# Patient Record
Sex: FEMALE | Race: WHITE | NOT HISPANIC OR LATINO | Employment: FULL TIME | URBAN - NONMETROPOLITAN AREA
[De-identification: names, ages, dates, MRNs, and addresses within clinical notes are randomized per-mention and may not be internally consistent; named-entity substitution may affect disease eponyms.]

---

## 2020-04-10 ENCOUNTER — OFFICE VISIT CONVERTED (OUTPATIENT)
Dept: FAMILY MEDICINE CLINIC | Age: 40
End: 2020-04-10
Attending: NURSE PRACTITIONER

## 2020-11-23 ENCOUNTER — OFFICE VISIT CONVERTED (OUTPATIENT)
Dept: FAMILY MEDICINE CLINIC | Age: 40
End: 2020-11-23
Attending: NURSE PRACTITIONER

## 2021-04-02 ENCOUNTER — HOSPITAL ENCOUNTER (OUTPATIENT)
Dept: OTHER | Facility: HOSPITAL | Age: 41
Discharge: HOME | End: 2021-04-02
Attending: NURSE PRACTITIONER

## 2021-04-02 LAB
APPEARANCE UR: ABNORMAL
BACTERIA UR QL AUTO: ABNORMAL
BILIRUB UR QL: NEGATIVE
CASTS URNS QL MICRO: ABNORMAL /[LPF]
COLOR UR: ABNORMAL
CONV LEUKOCYTE ESTERASE: ABNORMAL
CONV UROBILINOGEN IN URINE BY AUTOMATED TEST STRIP: 0.2 {EHRLICHU}/DL (ref 0.1–1)
EPI CELLS #/AREA URNS HPF: ABNORMAL /[HPF]
GLUCOSE 24H UR-MCNC: NEGATIVE MG/DL
HGB UR QL STRIP: ABNORMAL
KETONES UR QL STRIP: NEGATIVE MG/DL
MUCOUS THREADS URNS QL MICRO: ABNORMAL
NITRITE UR-MCNC: POSITIVE MG/ML
PH UR STRIP.AUTO: 6 [PH] (ref 5–8)
PROT UR-MCNC: 100 MG/DL
RBC # BLD AUTO: ABNORMAL /[HPF]
SP GR UR STRIP: <=1.005 (ref 1–1.03)
SPECIMEN SOURCE: ABNORMAL
UNIDENT CRYS URNS QL MICRO: ABNORMAL /[HPF]
WBC #/AREA URNS HPF: ABNORMAL /[HPF]

## 2021-04-04 LAB — BACTERIA UR CULT: NORMAL

## 2021-04-28 ENCOUNTER — HOSPITAL ENCOUNTER (OUTPATIENT)
Dept: OTHER | Facility: HOSPITAL | Age: 41
Discharge: HOME | End: 2021-04-28
Attending: NURSE PRACTITIONER

## 2021-04-28 LAB
ALBUMIN SERPL-MCNC: 4.6 G/DL (ref 3.5–5)
ALBUMIN/GLOB SERPL: 1.8 {RATIO} (ref 1.4–2.6)
ALP SERPL-CCNC: 50 U/L (ref 42–98)
ALT SERPL-CCNC: 20 U/L (ref 10–40)
ANION GAP SERPL CALC-SCNC: 16 MMOL/L (ref 8–19)
APPEARANCE UR: ABNORMAL
AST SERPL-CCNC: 15 U/L (ref 15–50)
BASOPHILS # BLD MANUAL: 0.03 10*3/UL (ref 0–0.2)
BASOPHILS NFR BLD MANUAL: 0.4 % (ref 0–3)
BILIRUB SERPL-MCNC: 0.22 MG/DL (ref 0.2–1.3)
BILIRUB UR QL: NEGATIVE
BUN SERPL-MCNC: 8 MG/DL (ref 5–25)
BUN/CREAT SERPL: 9 {RATIO} (ref 6–20)
CALCIUM SERPL-MCNC: 9.9 MG/DL (ref 8.7–10.4)
CHLORIDE SERPL-SCNC: 104 MMOL/L (ref 99–111)
CHOLEST SERPL-MCNC: 191 MG/DL (ref 107–200)
CHOLEST/HDLC SERPL: 2.9 {RATIO} (ref 3–6)
COLOR UR: YELLOW
CONV BACTERIA: ABNORMAL
CONV CO2: 25 MMOL/L (ref 22–32)
CONV COLLECTION SOURCE (UA): ABNORMAL
CONV HYALINE CASTS IN URINE MICRO: ABNORMAL /[LPF]
CONV TOTAL PROTEIN: 7.1 G/DL (ref 6.3–8.2)
CONV UROBILINOGEN IN URINE BY AUTOMATED TEST STRIP: 1 {EHRLICHU}/DL (ref 0.1–1)
CONV YEAST, UA: PRESENT
CREAT UR-MCNC: 0.9 MG/DL (ref 0.5–0.9)
DEPRECATED RDW RBC AUTO: 42 FL
EOSINOPHIL # BLD MANUAL: 0.14 10*3/UL (ref 0–0.7)
EOSINOPHIL NFR BLD MANUAL: 2.1 % (ref 0–7)
ERYTHROCYTE [DISTWIDTH] IN BLOOD BY AUTOMATED COUNT: 12.4 % (ref 11.5–14.5)
GFR SERPLBLD BASED ON 1.73 SQ M-ARVRAT: >60 ML/MIN/{1.73_M2}
GLOBULIN UR ELPH-MCNC: 2.5 G/DL (ref 2–3.5)
GLUCOSE SERPL-MCNC: 94 MG/DL (ref 65–99)
GLUCOSE UR QL: NEGATIVE MG/DL
GRANS (ABSOLUTE): 3.95 10*3/UL (ref 2–8)
GRANS: 59.3 % (ref 30–85)
HBA1C MFR BLD: 13.5 G/DL (ref 12–16)
HCT VFR BLD AUTO: 40.7 % (ref 37–47)
HDLC SERPL-MCNC: 65 MG/DL (ref 40–60)
HGB UR QL STRIP: NEGATIVE
IMM GRANULOCYTES # BLD: 0.01 10*3/UL (ref 0–0.54)
IMM GRANULOCYTES NFR BLD: 0.1 % (ref 0–0.43)
KETONES UR QL STRIP: NEGATIVE MG/DL
LDLC SERPL CALC-MCNC: 108 MG/DL (ref 70–100)
LEUKOCYTE ESTERASE UR QL STRIP: ABNORMAL
LYMPHOCYTES # BLD MANUAL: 2.04 10*3/UL (ref 1–5)
LYMPHOCYTES NFR BLD MANUAL: 7.6 % (ref 3–10)
MCH RBC QN AUTO: 30.3 PG (ref 27–31)
MCHC RBC AUTO-ENTMCNC: 33.2 G/DL (ref 33–37)
MCV RBC AUTO: 91.5 FL (ref 81–99)
MONOCYTES # BLD AUTO: 0.51 10*3/UL (ref 0.2–1.2)
NITRITE UR QL STRIP: NEGATIVE
OSMOLALITY SERPL CALC.SUM OF ELEC: 290 MOSM/KG (ref 273–304)
PH UR STRIP.AUTO: 7.5 [PH] (ref 5–8)
PLATELET # BLD AUTO: 246 10*3/UL (ref 130–400)
PMV BLD AUTO: 9.1 FL (ref 7.4–10.4)
POTASSIUM SERPL-SCNC: 4.4 MMOL/L (ref 3.5–5.3)
PROT UR QL: NEGATIVE MG/DL
RBC # BLD AUTO: 4.45 10*6/UL (ref 4.2–5.4)
RBC #/AREA URNS HPF: ABNORMAL /[HPF]
SODIUM SERPL-SCNC: 141 MMOL/L (ref 135–147)
SP GR UR: 1.02 (ref 1–1.03)
SQUAMOUS SPT QL MICRO: ABNORMAL /[HPF]
T4 FREE SERPL-MCNC: 0.8 NG/DL (ref 0.9–1.8)
TRIGL SERPL-MCNC: 91 MG/DL (ref 40–150)
TSH SERPL-ACNC: 6.91 M[IU]/L (ref 0.27–4.2)
VARIANT LYMPHS NFR BLD MANUAL: 30.5 % (ref 20–45)
VLDLC SERPL-MCNC: 18 MG/DL (ref 5–37)
WBC # BLD AUTO: 6.68 10*3/UL (ref 4.8–10.8)
WBC #/AREA URNS HPF: ABNORMAL /[HPF]

## 2021-04-29 LAB
CONV ANTI MICROSOMAL AB: 87 IU/ML (ref 0–34)
THYROGLOBULIN ANTIBODY: <1 IU/ML (ref 0–0.9)

## 2021-04-30 LAB — BACTERIA UR CULT: NORMAL

## 2021-05-04 ENCOUNTER — OFFICE VISIT CONVERTED (OUTPATIENT)
Dept: FAMILY MEDICINE CLINIC | Age: 41
End: 2021-05-04
Attending: NURSE PRACTITIONER

## 2021-05-18 NOTE — PROGRESS NOTES
Ambika Joe  1980     Office/Outpatient Visit    Visit Date:  08:45 am    Provider: Alex Coates N.P. (Assistant: Sarah Santana MA)    Location: [facility]        Electronically signed by Alex Coates N.P. on  2020 09:27:01 AM                             Subjective:        CC: Ms. Joe is a 40 year old female.  Follow up [facility] [phone];         HPI:       Telehealth visit for weight gain. Has tried to take Phentermin in 2020 but never really got on board and did not take it like it is directed. Now is ready to restart , going to [employer] wat[employer] now  , has their points tracker and is watching her intake and drinking more water.    ROS:     CONSTITUTIONAL:  Positive for unintentional weight gain ( gradual ).   Negative for fatigue or fever.      CARDIOVASCULAR:  Negative for chest pain, palpitations, tachycardia, orthopnea, and edema.      RESPIRATORY:  Negative for recent cough, dyspnea and frequent wheezing.      GASTROINTESTINAL:  Negative for abdominal pain, constipation, diarrhea, nausea and vomiting.      PSYCHIATRIC:  Positive for taking wellbutrin 300mg daily  , thinks it may be making her foggy brained, wants to try lower dose to see if that helps..          Past Medical History / Family History / Social History:         Last Reviewed on 2020 09:23 AM by Alex Coates    Past Medical History:                 PAST MEDICAL HISTORY             GYNECOLOGICAL HISTORY:        No pregnancy-related problems.    No problems with menstrual cycles.    Contraception: S/P tubal ligation;    Menarche occurred at age 13.    (+) hx of abnormal Pap ( dx'd in  , LEEP , since then neg )         PREVENTIVE HEALTH MAINTENANCE             PAP SMEAR: was last done  with normal results         Surgical History:     Other Surgeries     section: X 2; 2012, 2014;     Left hand surgery at age 21;         Family History:      Father: Medical history unknown     Mother: Healthy     Brother(s): one brother alive and healthy brother(s) total     Sister(s): 2 sisters alive and healthy sister(s) total         Social History:     Occupation: PT [employer]     Marital Status:      Children: 4 children         Tobacco/Alcohol/Supplements:     Last Reviewed on 11/23/2020 09:23 AM by Alex Coates    Tobacco: She has never smoked.          Substance Abuse History:     Last Reviewed on 11/23/2020 09:23 AM by Alex Coates        Mental Health History:     Last Reviewed on 11/23/2020 09:23 AM by Alex Coates        Communicable Diseases (eg STDs):     Last Reviewed on 11/23/2020 09:23 AM by Alex Coates        Current Problems:     Last Reviewed on 11/23/2020 09:23 AM by Aelx Coates    Morbid (severe) obesity due to excess calories    Adjustment disorder with mixed anxiety and depressed mood    Essential (primary) hypertension        Immunizations:     None        Allergies:     Last Reviewed on 11/23/2020 09:23 AM by Alex Coates    Hydrocodone/Acetaminophen:          Current Medications:     Last Reviewed on 11/23/2020 09:23 AM by Alex Coates    buPROPion  mg oral Tablet, Extended Release 24 hr [take 1 tablet (300 mg) by oral route once daily]    phentermine 37.5 mg oral tablet [TAKE ONE TABLET BY MOUTH DAILY]    metoprolol succinate 50 mg oral Tablet, Extended Release 24 hr [take 1 tablet (50 mg) by oral route once daily]    Zofran 4 mg oral tablet [1 tablet every 6 hrs prn nausea]        Objective:        Vitals:         Current: 11/23/2020 9:10:01 AM    Ht:  5 ft, 7 in;  Wt: 240 lbs;  BMI: 37.6BP: 118/72 mm Hg (left arm, sitting)        Exams:     PHYSICAL EXAM:     GENERAL: no apparent distress;     NEUROLOGIC: GROSSLY INTACT     PSYCHIATRIC:  appropriate affect and demeanor; normal speech pattern; grossly normal memory;         Assessment:         E66.01   Morbid (severe)  obesity due to excess calories       F43.23   Adjustment disorder with mixed anxiety and depressed mood           ORDERS:         Meds Prescribed:       [New Rx] buPROPion  mg oral Tablet, Extended Release 24 hr [TAKE 1 TABLET BY MOUTH EVERY DAY], #30 (thirty) tablets, Refills: 0 (zero)       [Refilled] phentermine 37.5 mg oral tablet [TAKE ONE TABLET BY MOUTH DAILY], #30 (thirty) tablets, Refills: 0 (zero)         Lab Orders:       APPTO  Appointment need  (In-House)                      Plan:         Morbid (severe) obesity due to excess calories    Telehealth: Verbal consent obtained for visit to occur via phone call; Staff, other than provider, present during telephone visit include Ambika Joe pt, Alex GONZALEZ; Total time spent was 12 minutes; 80213--Vrmzlszhi E/M 11-20 minutes CARE PLAN:  This plan was designed by your provider to assist in improving your health and well being.      GOALS for better health: achieve healthy weight     EXERCISE Your provider has outlined the following exercise regiment:    CARDIO at low intensity (examples of low intensity exercise include: walking, swimming, and stretching) for 0-15 minutes three times a week     NUTRITION Your provider advises limiting foods that taste sweet including: soda, candy, cake, pie, jam, canned fruit in syrup and cookies, limiting foods high in carbs like baked goods, low fat packaged foods, potatoes, pizza, and sugary cereals, and staying hydrated: the [place] of [employer] determined men need roughly 3 liters (about 13 cups) of total beverages a day. Women need about 2.2 liters (about 9 cups) of total beverages a day.            Prescriptions:       [Refilled] phentermine 37.5 mg oral tablet [TAKE ONE TABLET BY MOUTH DAILY], #30 (thirty) tablets, Refills: 0 (zero)         Adjustment disorder with mixed anxiety and depressed mood        FOLLOW-UP: Schedule follow-up appointments on a p.r.n. basis.:  Schedule a follow-up  visit in 1 month.:.:for Will need UDS           Prescriptions:       [New Rx] buPROPion  mg oral Tablet, Extended Release 24 hr [TAKE 1 TABLET BY MOUTH EVERY DAY], #30 (thirty) tablets, Refills: 0 (zero)           Orders:       APPTO  Appointment need  (In-House)                  Patient Recommendations:        For  Adjustment disorder with mixed anxiety and depressed mood:    Schedule follow-up appointments as needed. Schedule a follow-up visit in 1 month.                APPOINTMENT INFORMATION:        Monday Tuesday Wednesday Thursday Friday Saturday Sunday            Time:___________________AM  PM   Date:_____________________             Charge Capture:         Primary Diagnosis:     E66.01  Morbid (severe) obesity due to excess calories           Orders:      73353  Phys/QHP telephone evaluation 11-20 minutes  (In-House)              F43.23  Adjustment disorder with mixed anxiety and depressed mood           Orders:      APPTO  Appointment need  (In-House)

## 2021-05-18 NOTE — PROGRESS NOTES
Ambika Joe  1980     Office/Outpatient Visit    Visit Date: Fri, Apr 10, 2020 09:45 am    Provider: Alex Coates N.P. (Assistant: Mary Chun MA)    Location: [facility]        Electronically signed by Alex Coates N.P. on  04/10/2020 10:45:17 AM                             Subjective:        CC: Ms. Joe is a 39 year old female.  establish care, weight loss;         HPI:       Teleheath visit to establish care and discuss weight gain. Has tried weight watchers, NOOM, Keto diet, Nutra system, and OTC weight loss meds that are suppose to help with weight loss. Has taken Phentermine in the past (about 6 years ago) and lost about 45 pounds in 3mo period.    ROS:     CONSTITUTIONAL:  Positive for unintentional weight gain ( rapid ).   Negative for fatigue or fever.      CARDIOVASCULAR:  Negative for chest pain, palpitations, tachycardia, orthopnea, and edema.      RESPIRATORY:  Negative for recent cough, dyspnea and frequent wheezing.      GASTROINTESTINAL:  Negative for abdominal pain, constipation, diarrhea, nausea and vomiting.      GENITOURINARY:  Negative for dysuria and hematuria.      PSYCHIATRIC:  Negative for anxiety, depression, and sleep disturbances.          Past Medical History / Family History / Social History:         Last Reviewed on 4/10/2020 10:17 AM by Alex Coates    Past Medical History:                 PAST MEDICAL HISTORY             GYNECOLOGICAL HISTORY:        No pregnancy-related problems.    No problems with menstrual cycles.    Contraception: S/P tubal ligation;    Menarche occurred at age 13.    (+) hx of abnormal Pap ( dx'd in  , LEEP , since then neg )         PREVENTIVE HEALTH MAINTENANCE             PAP SMEAR: was last done  with normal results         Surgical History:     Other Surgeries     section: X 2; 2012, 2014;     Left hand surgery at age 21;         Family History:     Father: Medical history  unknown     Mother: Healthy     Brother(s): one brother alive and healthy brother(s) total     Sister(s): 2 sisters alive and healthy sister(s) total         Social History:     Occupation: PT [employer]     Marital Status:      Children: 4 children         Tobacco/Alcohol/Supplements:     Last Reviewed on 4/10/2020 10:17 AM by Alex Coates    Tobacco: She has never smoked.          Mental Health History:     Last Reviewed on 4/10/2020 10:17 AM by Alex Coates        Current Problems:     Last Reviewed on 4/10/2020 10:17 AM by Alex Coates    Adjustment disorder with mixed anxiety and depressed mood    Essential (primary) hypertension        Immunizations:     None        Allergies:     Last Reviewed on 4/10/2020 10:17 AM by Alex Coates    Hydrocodone/Acetaminophen:          Current Medications:     Last Reviewed on 4/10/2020 10:17 AM by Alex Coates    buPROPion  mg oral Tablet, Extended Release 24 hr [take 1 tablet (300 mg) by oral route once daily]    metoprolol succinate 50 mg oral Tablet, Extended Release 24 hr [take 1 tablet (50 mg) by oral route once daily]        Objective:        Vitals: 132/72 this am         Assessment:         E66.01   Morbid (severe) obesity due to excess calories           ORDERS:         Meds Prescribed:       [New Rx] phentermine 37.5 mg oral tablet [1 tab daily], #30 (thirty) capsules, Refills: 0 (zero)       [Refilled] metoprolol succinate 50 mg oral Tablet, Extended Release 24 hr [take 1 tablet (50 mg) by oral route once daily], #90 (ninety) tablets, Refills: 0 (zero)         Lab Orders:       APPTO  Appointment need  (In-House)                      Plan:         Morbid (severe) obesity due to excess caloriesCome in when your able for UDS, and sign consent. Pablo today. dmt    CARE PLAN:  This plan was designed by your provider to assist in improving your health and well being.      WEIGHT recommendation to improve your health:  lose Lose 10% weight pounds     EXERCISE Your provider has outlined the following exercise regiment:    CARDIO at medium intensity (exercise that raises your heart rate to a point where you sweat and feel you're working, yet you're able to carry on a conversation) for 15-30 minutes three times a week     NUTRITION Your provider advises limiting foods high in fat like processed meats (sausage), nuts, cheeses, butter, and packaged foods with hydrogenated oils, limiting foods high in carbs like baked goods, low fat packaged foods, potatoes, pizza, and sugary cereals, and staying hydrated: the [place] of Medicine determined men need roughly 3 liters (about 13 cups) of total beverages a day. Women need about 2.2 liters (about 9 cups) of total beverages a day.      BLOOD PRESSURE Keep under 140, and under 90 Telehealth: Verbal consent obtained for visit to occur via televideo conferencing; Staff, other than provider, present during telephone visit include LISA Jacques, Ambika Joe pt; Total time spent was 25 minutes     FOLLOW-UP: Schedule a follow-up visit in 1 month.:.            Prescriptions:       [New Rx] phentermine 37.5 mg oral tablet [1 tab daily], #30 (thirty) capsules, Refills: 0 (zero)           Orders:       APPTO  Appointment need  (In-House)                  Other Prescriptions:       [Refilled] metoprolol succinate 50 mg oral Tablet, Extended Release 24 hr [take 1 tablet (50 mg) by oral route once daily], #90 (ninety) tablets, Refills: 0 (zero)         Patient Recommendations:        For  Morbid (severe) obesity due to excess calories:    Schedule a follow-up visit in 1 month.                APPOINTMENT INFORMATION:        Monday Tuesday Wednesday Thursday Friday Saturday Sunday            Time:___________________AM  PM   Date:_____________________             Charge Capture:         Primary Diagnosis:     E66.01  Morbid (severe) obesity due to excess calories           Orders:       71783  Office visit - new pt, level 2  (In-House)            APPTO  Appointment need  (In-House)

## 2021-05-18 NOTE — PROGRESS NOTES
"Ambika Joe  1980     Office/Outpatient Visit    Visit Date: Tue, May 4, 2021 11:15 am    Provider: Alex Coates N.P. (Assistant: Treasure Prajapati LPN)    Location: [facility]        Electronically signed by Alex Coates N.P. on  05/04/2021 11:53:20 AM                             Subjective:        CC: Ms. Joe is a 40 year old female.  preventative exam, [facility] [phone];         HPI:           Ms. Joe presents with encounter for general adult medical examination without abnormal findings.  She cannot recall when she last had a physical exam.  Her last menstrual period was first part of april, has had a BLT.  Her current method of contraception is a tubal ligation.  She performs breast self-exams occasionally.   Her last Pap smear was <1 year ago, was normal, and NP Kasandra Money in [facility], in practice with \"Dr. Shahid\".   She has never had a mammogram. Preventative Health updated today.  Ms. Joe has had an abnormal Pap smear in the past. 13 years ago Tobacco: She has never smoked.            PHQ-9 Depression Screening: Completed form scanned and in chart; Total Score 9     ROS:     CONSTITUTIONAL:  Positive for unintentional weight gain ( gradual ).   Negative for fatigue or fever.      E/N/T:  Negative for diminished hearing and nasal congestion.      CARDIOVASCULAR:  Negative for chest pain, palpitations, tachycardia, orthopnea, and edema.      RESPIRATORY:  Negative for recent cough, dyspnea and frequent wheezing.      GASTROINTESTINAL:  Negative for abdominal pain, constipation, diarrhea, nausea and vomiting.      GENITOURINARY:  Negative for dysuria and hematuria.      ENDOCRINE:  Positive for new dx of Hypothyroidism, just started on thyroid med.      PSYCHIATRIC:  Positive for anxiety and feelings of stress.   Negative for crying spells, anhedonia, mood swings, sadness or suicidal thoughts.          Past Medical History / Family History / Social " History:         Last Reviewed on 2021 11:41 AM by Alex Coates    Past Medical History:                 PAST MEDICAL HISTORY             GYNECOLOGICAL HISTORY:        No pregnancy-related problems.    No problems with menstrual cycles.    Contraception: S/P tubal ligation;    Menarche occurred at age 13.    (+) hx of abnormal Pap ( dx'd in  , LEEP , since then neg )         PREVENTIVE HEALTH MAINTENANCE             PAP SMEAR: was last done 10/2020 with normal results         Surgical History:     Other Surgeries     section: X 2; , 2014;     Left hand surgery at age 21;         Family History:     Father: Medical history unknown     Mother: Healthy     Brother(s): one brother alive and healthy brother(s) total     Sister(s): 2 sisters alive and healthy sister(s) total         Social History:     Occupation: PT [employer]     Marital Status:      Children: 4 children         Tobacco/Alcohol/Supplements:     Last Reviewed on 2021 11:41 AM by Alex Coates    Tobacco: She has never smoked.          Mental Health History:     Last Reviewed on 2021 11:41 AM by Alex Coates        Current Problems:     Last Reviewed on 2021 11:41 AM by Alex Coates    Adjustment disorder with mixed anxiety and depressed mood    Essential (primary) hypertension    Other long term (current) drug therapy    Hypothyroidism, unspecified        Immunizations:     SARS-COV-2 (COVID-19) vaccine, UNSPECIFIED 2021    SARS-COV-2 (COVID-19) vaccine, UNSPECIFIED 2021        Allergies:     Last Reviewed on 2021 11:41 AM by Alex Coates    Hydrocodone/Acetaminophen:          Current Medications:     Last Reviewed on 2021 11:41 AM by Alex Coates    phentermine 37.5 mg oral tablet [TAKE ONE TABLET BY MOUTH DAILY]    metoprolol succinate 50 mg oral Tablet, Extended Release 24 hr [take 1 tablet (50 mg) by oral route once daily]    Zofran 4 mg oral  tablet [1 tablet every 6 hrs prn nausea]    buPROPion  mg oral Tablet, Extended Release 24 hr [TAKE ONE TABLET BY MOUTH DAILY]    levothyroxine 50 mcg oral tablet [take 1 tablet (50 mcg) by oral route once daily by itself without other medications or food ]        Objective:        Exams:     PHYSICAL EXAM:     GENERAL: well developed, well nourished;  well groomed;  no apparent distress;     RESPIRATORY: normal respiratory rate and pattern with no distress;     MUSCULOSKELETAL: normal gait;     NEUROLOGIC: GROSSLY INTACT     PSYCHIATRIC:  appropriate affect and demeanor; normal speech pattern; grossly normal memory;         Assessment:         Z00.00   Encounter for general adult medical examination without abnormal findings       Z13.31   Encounter for screening for depression       Z12.31   Encounter for screening mammogram for malignant neoplasm of breast       E03.9   Hypothyroidism, unspecified           ORDERS:         Radiology/Test Orders:       01189  Screening digital breast tomosynthesis bi  (Send-Out)    Due in October , annual mammogram ,           Lab Orders:       FUTURE  Future order to be done at patients convenience  (Send-Out)    Due around June 9th 2021         72855  TSH - [facility] TSH  (Send-Out)              Other Orders:         Depression screen negative  (In-House)            1101F  Pt screen for fall risk; document no falls in past year or only 1 fall w/o injury in past year (ODETTE)  (In-House)                      Plan:         Encounter for general adult medical examination without abnormal findings    MIPS Has had no falls or only one fall without injury in the past year Vaccines Flu and Pneumonia updated in Shot record           Orders:       1101F  Pt screen for fall risk; document no falls in past year or only 1 fall w/o injury in past year (ODETTE)  (In-House)              Encounter for screening for depression    MIPS PHQ-9 Depression Screening: Completed form scanned and in  chart; Total Score 9; Positive Depression Screen but after further evaluation the patient does not have a diagnosis of depression.            Orders:         Depression screen negative  (In-House)              Encounter for screening mammogram for malignant neoplasm of breast        FOLLOW-UP TESTING #1:    RADIOLOGY:  I have ordered Screening 3D Mammogram Bilateral to be done today.            Orders:       83043  Screening digital breast tomosynthesis bi  (Send-Out)    Due in October , annual mammogram ,           Hypothyroidism, unspecified        FOLLOW-UP TESTING #1: FOLLOW-UP LABORATORY:  Labs to be scheduled in the future include TSH.            Orders:       FUTURE  Future order to be done at patients convenience  (Send-Out)    Due around June 9th 2021         93791  TSH - [facility] TSH  (Send-Out)                  Patient Recommendations:        For  Hypothyroidism, unspecified:            The following laboratory testing has been ordered: TSH             Charge Capture:         Primary Diagnosis:     Z00.00  Encounter for general adult medical examination without abnormal findings           Orders:      66153  Preventive medicine, established patient, age 18-39 years  (In-House)            1101F  Pt screen for fall risk; document no falls in past year or only 1 fall w/o injury in past year (ODETTE)  (In-House)              Z13.31  Encounter for screening for depression           Orders:        Depression screen negative  (In-House)              Z12.31  Encounter for screening mammogram for malignant neoplasm of breast     E03.9  Hypothyroidism, unspecified

## 2021-06-22 RX ORDER — ONDANSETRON 4 MG/1
4 TABLET, FILM COATED ORAL EVERY 6 HOURS PRN
COMMUNITY
End: 2022-07-21

## 2021-06-22 RX ORDER — BUPROPION HYDROCHLORIDE 150 MG/1
150 TABLET, EXTENDED RELEASE ORAL DAILY
COMMUNITY
End: 2021-10-01

## 2021-06-22 RX ORDER — PHENTERMINE HYDROCHLORIDE 37.5 MG/1
37.5 CAPSULE ORAL EVERY MORNING
COMMUNITY
End: 2021-10-01

## 2021-06-22 RX ORDER — PHENTERMINE HYDROCHLORIDE 37.5 MG/1
TABLET ORAL
Qty: 10 TABLET | Refills: 0 | Status: SHIPPED | OUTPATIENT
Start: 2021-06-22 | End: 2021-10-01

## 2021-06-22 RX ORDER — LEVOTHYROXINE SODIUM 0.05 MG/1
50 TABLET ORAL DAILY
COMMUNITY
End: 2021-06-26

## 2021-06-22 RX ORDER — METOPROLOL SUCCINATE 50 MG/1
50 TABLET, EXTENDED RELEASE ORAL DAILY
COMMUNITY
End: 2021-11-03

## 2021-06-22 NOTE — TELEPHONE ENCOUNTER
On call for DT  Phentermine  LF:4/29/21 #30  LV:5/4/21  Tox:2/2/21  lmom inf pt needs to be seen

## 2021-06-25 NOTE — TELEPHONE ENCOUNTER
LAST OV: 5/4/21  NEXT OV: None  LAST LAB: 4/28/21    PLEASE SIGN OR ADVISE    Anti-Thyroglobulin Antibody  Order: [id number]  Status:  Final result   Visible to patient:  No (not released)   Next appt:  None       Component   Ref Range & Units 1 mo ago   THYROGLOBULIN ANTIBODY   0.0 - 0.9 IU/mL <1.0    Comment: Thyroglobulin Antibody measured by [facility] Methodology         Specimen Collected: 04/28/21 10:43 Last Resulted: 04/29/21 15:09        Lab Flowsheet     Order Details     View Encounter     Lab and Collection Details     Routing     Result History              Other Results from 4/28/2021    Contains abnormal data Urinalysis With Microscopic -    Status:  Final result   Visible to patient:  No (not released)   Next appt:  None  Order: [id number]       Component   Ref Range & Units 1 mo ago   Collection Source (UA)   NA Clean Catch    Color   NA YELLOW    Appearance, UA   NA CLOUDYAbnormal     Specific Gravity, UA   1.005 - 1.030 NA 1.019    pH, UA   5.0 - 8.0 [pH] 7.5    Protein Urine Screen   NEGATIVE mg/dL NEGATIVE    Glucose, UA   NEGATIVE mg/dL NEGATIVE    Ketones, UA   NEGATIVE mg/dL NEGATIVE    BILIRUBIN SCREEN URINE   NEGATIVE NA NEGATIVE    Blood, UA   NEGATIVE NA NEGATIVE    Nitrite, UA   NEGATIVE NA NEGATIVE    Urobilinogen, UA   0.1 - 1.0 /dL 1.0    Leukocytes, UA   NEGATIVE NA MODERATEAbnormal     Comment: URINE MICROSCOPICS:     Only performed if any of the following are present:     Appearance is Sl Cloudy to Turbid; Protein is     30 to >/= 300 mg/dL; Occult Blood, Nitrite, or Leukocyte     Esterase is positive. Color is Red or Orange.    WBC, UA   /[HPF] MANY(21-50)Abnormal     RBC, UA   /[HPF] NONE SEEN    Epithelial cells   /[HPF] 2-5    BACTERIA   NA 1+Abnormal     Hyaline Casts, UA   /[LPF] 2-5    YEAST, UA   NA PRESENTAbnormal           Specimen Collected: 04/28/21 10:43 Last Resulted: 04/28/21 13:45        Lab Flowsheet     Order Details     View Encounter     Lab and Collection  Details     Routing     Result History                 Contains abnormal data Physical [facility] Care Panel    Status:  Final result   Visible to patient:  No (not released)   Next appt:  None  Order: [id number]       Component   Ref Range & Units 1 mo ago   Glucose   65 - 99 mg/dL 94    BUN   5 - 25 mg/dL 8    Creatinine   0.50 - 0.90 mg/dL 0.90    BUN/Creatinine Ratio   6 - 20  9    GFR   >60 mL/min/ >60    Comment: Interpretative Data:   ------------------------------------   STAGE                  GFR   Stage 1                90 mL/min or greater   Stage 2                60-89 mL/min   Stage 3                30-59 mL/min   Stage 4                15-29 mL/min   Value <60 mL/min for 3 or more months is defined as CKD.    Sodium   135 - 147 mmol/L 141    Potassium   3.5 - 5.3 mmol/L 4.4    Chloride   99 - 111 mmol/L 104    CO2   22 - 32 mmol/L 25    Anion Gap   8 - 19 mmol/L 16    OSMOLALITY CALC   273 - 304 290    Total Protein   6.3 - 8.2 g/dL 7.1    Comment: If Patient is receiving dextran as a blood volume expander   result may show a potential interference.    Albumin   3.5 - 5.0 g/dL 4.6    Globulin   2.0 - 3.5 g/dL 2.5    A/G Ratio   1.4 - 2.6  1.8    Calcium   8.7 - 10.4 mg/dL 9.9    Alkaline Phosphatase   42 - 98 U/L 50    ALT (SGPT)   10 - 40 U/L 20    AST (SGOT)   15 - 50 U/L 15    Total Bilirubin   0.20 - 1.30 mg/dL 0.22    WBC   4.80 - 10.80 10*3/uL 6.68    RBC   4.20 - 5.40 10*6/uL 4.45    Hgb   12.00 - 16.00 g/dL 13.50    Hematocrit   37.0 - 47.0 % 40.7    MCV   81.0 - 99.0 fL 91.5    MCH   27.0 - 31.0 pg 30.3    MCHC   33.0 - 37.0 33.2    Platelets   130.00 - 400.00 10*3/uL 246.00    RDW-SD   NA 42.0    RDW   11.5 - 14.5 % 12.4    MPV   7.4 - 10.4 fL 9.1    Comment: Testing performed by:   [facility] [place] [facility]   CLIA#[id number]   [address]   [place] [address]    Neutrophil Absolute   2.00 - 8.00 10*3/uL 3.95    Lymphocytes Absolute   1.00 - 5.00 10*3/uL  2.04    Monocytes Absolute   0.20 - 1.20 10*3/uL 0.51    Eosinophils Absolute   0.00 - 0.70 10*3/uL 0.14    Basophils Absolute   0.00 - 0.20 10*3/uL 0.03    Immature Grans Absolute   0.00 - 0.54 10*3/uL 0.01    Neutrophil Rel %   30.0 - 85.0 % 59.3    Lymphocyte %   20.0 - 45.0 % 30.5    Monocyte %   3.0 - 10.0 % 7.6    Eosinophil %   0.00 - 7.00 % 2.10    Basophil %   0.00 - 3.00 % 0.40    Immature Granulocyte Rel %   0.00 - 0.43 % 0.10    TSH   0.270 - 4.200 m[iU]/L 6.910High     Triglycerides   40 - 150 mg/dL 91    Comment: <150 mg/dL-Normal   150-199 mg/dL-Borderline High   200-499 mg/dL-High   >500 mg/dL- Very High    Total Cholesterol   107 - 200 mg/dL 191    Comment: <200 mg/dL-Desirable   200-239 mg/dL-Borderline High   >240 mg/dL-High   >500 mg/dL-Very High    HDL Cholesterol   40 - 60 mg/dL 65High     Comment: <40 mg/dL-Low   >60 mg/dL- Desirable    Chol/HDL Ratio   3.0 - 6.0 NA 2.9Low     LDL Cholesterol    70 - 100 mg/dL 108High     Comment: Recommended  LDL Levels   <100 mg/dL-Optimal   100-129 mg/dL-Near Optimal/above optimal   130-159 mg/dL-Borderline High   160-189 mg/dL-High   >190 mg/dL-Very High    VLDL Cholesterol   5 - 37 mg/dL 18          Specimen Collected: 04/28/21 10:43 Last Resulted: 04/28/21 14:32        Lab Flowsheet     Order Details     View Encounter     Lab and Collection Details     Routing     Result History                 Contains abnormal data T4, Free    Status:  Final result   Visible to patient:  No (not released)   Next appt:  None  Order: [id number]       Component   Ref Range & Units 1 mo ago   Free T4   0.9 - 1.8 ng/dL 0.8Low           Specimen Collected: 04/28/21 10:43 Last Resulted: 04/28/21 20:33        Lab Flowsheet     Order Details     View Encounter     Lab and Collection Details     Routing     Result History                 Contains abnormal data Anti-microsomal Antibody    Status:  Final result   Visible to patient:  No (not released)   Next appt:  None  Order:  [id number]       Component   Ref Range & Units 1 mo ago   Anti Microsomal AB   0 - 34 IU/mL 87High           Specimen Collected: 04/28/21 10:43 Last Resulted: 04/29/21 09:12        Lab Flowsheet     Order Details     View Encounter     Lab and Collection Details     Routing     Result History                 Urine Culture - ,    Status:  Final result   Visible to patient:  No (not released)   Next appt:  None  Order: [id number]       Component    Urine Culture No Uropathogens Isolated.     Comment: No Uropathogens Isolated.      Resulting Agency [facility]         Specimen Collected: 04/28/21 10:43 Last Resulted: 04/30/21 08:00

## 2021-06-26 RX ORDER — LEVOTHYROXINE SODIUM 0.05 MG/1
TABLET ORAL
Qty: 30 TABLET | Refills: 0 | Status: SHIPPED | OUTPATIENT
Start: 2021-06-26 | End: 2021-10-01

## 2021-07-02 VITALS
DIASTOLIC BLOOD PRESSURE: 72 MMHG | BODY MASS INDEX: 37.67 KG/M2 | WEIGHT: 240 LBS | HEIGHT: 67 IN | SYSTOLIC BLOOD PRESSURE: 118 MMHG

## 2021-07-09 ENCOUNTER — TELEPHONE (OUTPATIENT)
Dept: FAMILY MEDICINE CLINIC | Age: 41
End: 2021-07-09

## 2021-07-09 ENCOUNTER — TRANSCRIBE ORDERS (OUTPATIENT)
Dept: ADMINISTRATIVE | Facility: HOSPITAL | Age: 41
End: 2021-07-09

## 2021-07-09 ENCOUNTER — LAB (OUTPATIENT)
Dept: LAB | Facility: HOSPITAL | Age: 41
End: 2021-07-09

## 2021-07-09 DIAGNOSIS — E03.9 HYPOTHYROIDISM, UNSPECIFIED TYPE: ICD-10-CM

## 2021-07-09 DIAGNOSIS — E03.9 HYPOTHYROIDISM, UNSPECIFIED TYPE: Primary | ICD-10-CM

## 2021-07-09 LAB
HOLD SPECIMEN: NORMAL
TSH SERPL DL<=0.05 MIU/L-ACNC: 3.6 UIU/ML (ref 0.27–4.2)

## 2021-07-09 PROCEDURE — 36415 COLL VENOUS BLD VENIPUNCTURE: CPT

## 2021-07-09 PROCEDURE — 84443 ASSAY THYROID STIM HORMONE: CPT

## 2021-07-09 NOTE — TELEPHONE ENCOUNTER
Caller: Ambika Joe    Relationship: Self    Best call back number: [phone]    Caller requesting test results: PATIENT    What test was performed: LABS    When was the test performed: 07/09/21    Where was the test performed: BY OFFICE 1ST FLOOR    Additional notes: SARANYA TOLD PATIENT SHE WOULD BE IN Thursday 07/15/21 AND PATIENT COULD COME FOR APPOINTMENT.

## 2021-07-11 DIAGNOSIS — E03.9 HYPOTHYROIDISM, UNSPECIFIED TYPE: Primary | ICD-10-CM

## 2021-07-11 RX ORDER — LEVOTHYROXINE SODIUM 0.07 MG/1
75 TABLET ORAL DAILY
Qty: 90 TABLET | Refills: 0 | Status: SHIPPED | OUTPATIENT
Start: 2021-07-11 | End: 2021-09-29

## 2021-07-12 ENCOUNTER — TELEPHONE (OUTPATIENT)
Dept: FAMILY MEDICINE CLINIC | Age: 41
End: 2021-07-12

## 2021-07-12 NOTE — TELEPHONE ENCOUNTER
----- Message from LISA Benites sent at 7/11/2021 11:10 AM EDT -----  TSH has improved , still on the high end of normal. 3.6 now was over 6, will increase her Lt4 to 75mcg orally daily , disp 90 and repeat TSH in 6 weeks, meds order placed by me. dmt

## 2021-07-12 NOTE — TELEPHONE ENCOUNTER
Left detailed message on pt's vm, pcp has 3 openings for Thursday, inf pt she could make an appt through [facility], message me directly through [facility], or call the office to schedule one of those times.

## 2021-07-26 RX ORDER — LEVOTHYROXINE SODIUM 0.05 MG/1
TABLET ORAL
Qty: 30 TABLET | Refills: 0 | OUTPATIENT
Start: 2021-07-26

## 2021-08-26 ENCOUNTER — TELEPHONE (OUTPATIENT)
Dept: FAMILY MEDICINE CLINIC | Age: 41
End: 2021-08-26

## 2021-08-26 ENCOUNTER — LAB (OUTPATIENT)
Dept: LAB | Facility: HOSPITAL | Age: 41
End: 2021-08-26

## 2021-08-26 DIAGNOSIS — E03.9 HYPOTHYROIDISM, UNSPECIFIED TYPE: ICD-10-CM

## 2021-08-26 LAB
T4 FREE SERPL-MCNC: 1.26 NG/DL (ref 0.93–1.7)
TSH SERPL DL<=0.05 MIU/L-ACNC: 2.9 UIU/ML (ref 0.27–4.2)

## 2021-08-26 PROCEDURE — 84443 ASSAY THYROID STIM HORMONE: CPT

## 2021-08-26 PROCEDURE — 84439 ASSAY OF FREE THYROXINE: CPT

## 2021-08-26 PROCEDURE — 36415 COLL VENOUS BLD VENIPUNCTURE: CPT

## 2021-08-26 NOTE — TELEPHONE ENCOUNTER
----- Message from Maritza Walsh LPN sent at 7/12/2021  9:50 AM EDT -----  repeat TSH in 6 weeks

## 2021-09-28 DIAGNOSIS — E03.9 HYPOTHYROIDISM, UNSPECIFIED TYPE: ICD-10-CM

## 2021-09-29 RX ORDER — LEVOTHYROXINE SODIUM 0.07 MG/1
TABLET ORAL
Qty: 30 TABLET | Refills: 0 | Status: SHIPPED | OUTPATIENT
Start: 2021-09-29 | End: 2021-10-27

## 2021-10-01 ENCOUNTER — TELEMEDICINE (OUTPATIENT)
Dept: FAMILY MEDICINE CLINIC | Age: 41
End: 2021-10-01

## 2021-10-01 DIAGNOSIS — F32.89 OTHER DEPRESSION: Primary | Chronic | ICD-10-CM

## 2021-10-01 PROCEDURE — 99213 OFFICE O/P EST LOW 20 MIN: CPT | Performed by: NURSE PRACTITIONER

## 2021-10-01 RX ORDER — BUPROPION HYDROCHLORIDE 300 MG/1
300 TABLET ORAL DAILY
Qty: 90 TABLET | Refills: 3 | Status: SHIPPED | OUTPATIENT
Start: 2021-10-01 | End: 2022-07-21 | Stop reason: SDUPTHER

## 2021-10-01 RX ORDER — BUPROPION HYDROCHLORIDE 150 MG/1
300 TABLET ORAL
COMMUNITY
Start: 2021-07-23 | End: 2021-10-01 | Stop reason: DRUGHIGH

## 2021-10-01 RX ORDER — FLUOXETINE 20 MG/1
20 TABLET, FILM COATED ORAL DAILY
Qty: 30 TABLET | Refills: 2 | Status: SHIPPED | OUTPATIENT
Start: 2021-10-01 | End: 2021-12-29

## 2021-10-01 NOTE — PROGRESS NOTES
Mode of Visit: Video  You have chosen to receive care through a telehealth visit.  Do you consent to use a video/audio connection for your medical care today? Yes   The visit included audio and video interaction. No technical issues occurred during this visit.      Subjective    Depression:   Telehealth video visit to discuss forgetfulness, low frustration tolerance, decreased concentration , low self esteem, anxiety, thoughts keeping me awake, problem following through most task, try to organize but fail most of the time, then that makes me depressed because it makes me feel like I fail at everyday life. Been going on for about 3 months , seems to be getting worse. Is taking Wellbutrin 150mg daily but increased to 300mg per self about 2 weeks ago. Tried Effexor years ago and did not like how it made her feel.             HPI:        Ambika Joe is a 40 y.o. female who presents to  [facility] today     Review of Systems   Constitutional: Negative for appetite change, chills and fever.   HENT: Negative.    Respiratory: Negative for cough, shortness of breath and wheezing.    Cardiovascular: Negative for chest pain, palpitations and leg swelling.   Gastrointestinal: Negative for abdominal pain.   Genitourinary: Negative for difficulty urinating and dysuria.   Musculoskeletal: Negative for gait problem.   Neurological: Negative for dizziness, tremors and seizures.   Psychiatric/Behavioral: Positive for agitation, decreased concentration, dysphoric mood and sleep disturbance. Negative for behavioral problems and suicidal ideas. The patient is nervous/anxious.             PE:   Objective   There were no vitals taken for this visit.    There is no height or weight on file to calculate BMI.    Physical Exam   Constitutional: She appears well-developed.   Eyes: Pupils are equal, round, and reactive to light.   Neck: No thyromegaly present.    Pulmonary/Chest: Effort normal.   Musculoskeletal: Normal range of motion.   Neurological: She is alert.            TSH+Free T4 (08/26/2021 14:42)                     A/P:     Assessment/Plan   Diagnoses and all orders for this visit:    1. Other depression (Primary)  Comments:  Stressed improtance of good sleep hygeine and start Prozac, will see how this works in 1 mo, if not improving may need increase   Orders:  -     buPROPion XL (WELLBUTRIN XL) 300 MG 24 hr tablet; Take 1 tablet by mouth Daily for 360 days.  Dispense: 90 tablet; Refill: 3  -     FLUoxetine (PROzac) 20 MG tablet; Take 1 tablet by mouth Daily for 90 days.  Dispense: 30 tablet; Refill: 2              Follow up:    Return in about 1 month (around 11/1/2021) for will fu in 1 mo to see how meds are working .

## 2021-10-27 DIAGNOSIS — E03.9 HYPOTHYROIDISM, UNSPECIFIED TYPE: ICD-10-CM

## 2021-10-27 RX ORDER — LEVOTHYROXINE SODIUM 0.07 MG/1
TABLET ORAL
Qty: 30 TABLET | Refills: 0 | Status: SHIPPED | OUTPATIENT
Start: 2021-10-27 | End: 2021-11-24

## 2021-11-03 RX ORDER — METOPROLOL SUCCINATE 50 MG/1
TABLET, EXTENDED RELEASE ORAL
Qty: 90 TABLET | Refills: 0 | Status: SHIPPED | OUTPATIENT
Start: 2021-11-03 | End: 2022-07-21

## 2021-11-17 ENCOUNTER — TELEPHONE (OUTPATIENT)
Dept: FAMILY MEDICINE CLINIC | Age: 41
End: 2021-11-17

## 2021-11-17 DIAGNOSIS — Z12.31 ENCOUNTER FOR SCREENING MAMMOGRAM FOR MALIGNANT NEOPLASM OF BREAST: Primary | ICD-10-CM

## 2021-11-17 NOTE — TELEPHONE ENCOUNTER
HUB WAS UNABLE TO WARM TRANSFER     Caller: Ambika Joe    Relationship to patient: Self    Best call back number: [phone]        Type of visit: MAMMOGRAM     Requested date: ASAP MORNINGS PREFERRED          Additional notes: PATIENT REQUESTING A MAMMOGRAM APPOINTMENT

## 2021-11-24 DIAGNOSIS — E03.9 HYPOTHYROIDISM, UNSPECIFIED TYPE: ICD-10-CM

## 2021-11-24 RX ORDER — LEVOTHYROXINE SODIUM 0.07 MG/1
TABLET ORAL
Qty: 30 TABLET | Refills: 0 | Status: SHIPPED | OUTPATIENT
Start: 2021-11-24 | End: 2021-12-29

## 2021-12-10 ENCOUNTER — DOCUMENTATION (OUTPATIENT)
Dept: FAMILY MEDICINE CLINIC | Age: 41
End: 2021-12-10

## 2021-12-10 RX ORDER — HYDROXYZINE HYDROCHLORIDE 10 MG/1
10 TABLET, FILM COATED ORAL 3 TIMES DAILY PRN
Qty: 30 TABLET | Refills: 1 | Status: SHIPPED | OUTPATIENT
Start: 2021-12-10 | End: 2022-01-09

## 2021-12-22 ENCOUNTER — DOCUMENTATION (OUTPATIENT)
Dept: FAMILY MEDICINE CLINIC | Age: 41
End: 2021-12-22

## 2021-12-22 ENCOUNTER — LAB (OUTPATIENT)
Dept: LAB | Facility: HOSPITAL | Age: 41
End: 2021-12-22

## 2021-12-22 DIAGNOSIS — R53.83 FATIGUE, UNSPECIFIED TYPE: ICD-10-CM

## 2021-12-22 DIAGNOSIS — R53.83 FATIGUE, UNSPECIFIED TYPE: Primary | ICD-10-CM

## 2021-12-22 LAB
ALBUMIN SERPL-MCNC: 4.6 G/DL (ref 3.5–5.2)
ALBUMIN/GLOB SERPL: 2.1 G/DL
ALP SERPL-CCNC: 59 U/L (ref 39–117)
ALT SERPL W P-5'-P-CCNC: 37 U/L (ref 1–33)
ANION GAP SERPL CALCULATED.3IONS-SCNC: 9.5 MMOL/L (ref 5–15)
AST SERPL-CCNC: 19 U/L (ref 1–32)
BILIRUB SERPL-MCNC: 0.2 MG/DL (ref 0–1.2)
BUN SERPL-MCNC: 10 MG/DL (ref 6–20)
BUN/CREAT SERPL: 12.3 (ref 7–25)
CALCIUM SPEC-SCNC: 9.7 MG/DL (ref 8.6–10.5)
CHLORIDE SERPL-SCNC: 103 MMOL/L (ref 98–107)
CO2 SERPL-SCNC: 26.5 MMOL/L (ref 22–29)
CREAT SERPL-MCNC: 0.81 MG/DL (ref 0.57–1)
DEPRECATED RDW RBC AUTO: 41.8 FL (ref 37–54)
ERYTHROCYTE [DISTWIDTH] IN BLOOD BY AUTOMATED COUNT: 12.7 % (ref 12.3–15.4)
GFR SERPL CREATININE-BSD FRML MDRD: 78 ML/MIN/1.73
GLOBULIN UR ELPH-MCNC: 2.2 GM/DL
GLUCOSE SERPL-MCNC: 82 MG/DL (ref 65–99)
HCT VFR BLD AUTO: 40.8 % (ref 34–46.6)
HGB BLD-MCNC: 13.6 G/DL (ref 12–15.9)
MCH RBC QN AUTO: 30.2 PG (ref 26.6–33)
MCHC RBC AUTO-ENTMCNC: 33.3 G/DL (ref 31.5–35.7)
MCV RBC AUTO: 90.7 FL (ref 79–97)
PLATELET # BLD AUTO: 236 10*3/MM3 (ref 140–450)
PMV BLD AUTO: 8.8 FL (ref 6–12)
POTASSIUM SERPL-SCNC: 4.2 MMOL/L (ref 3.5–5.2)
PROT SERPL-MCNC: 6.8 G/DL (ref 6–8.5)
RBC # BLD AUTO: 4.5 10*6/MM3 (ref 3.77–5.28)
SODIUM SERPL-SCNC: 139 MMOL/L (ref 136–145)
TSH SERPL DL<=0.05 MIU/L-ACNC: 2.16 UIU/ML (ref 0.27–4.2)
WBC NRBC COR # BLD: 7.58 10*3/MM3 (ref 3.4–10.8)

## 2021-12-22 PROCEDURE — 80053 COMPREHEN METABOLIC PANEL: CPT | Performed by: NURSE PRACTITIONER

## 2021-12-22 PROCEDURE — 84443 ASSAY THYROID STIM HORMONE: CPT

## 2021-12-22 PROCEDURE — 86376 MICROSOMAL ANTIBODY EACH: CPT | Performed by: NURSE PRACTITIONER

## 2021-12-22 PROCEDURE — 86800 THYROGLOBULIN ANTIBODY: CPT | Performed by: NURSE PRACTITIONER

## 2021-12-22 PROCEDURE — 85027 COMPLETE CBC AUTOMATED: CPT | Performed by: NURSE PRACTITIONER

## 2021-12-22 PROCEDURE — 36415 COLL VENOUS BLD VENIPUNCTURE: CPT | Performed by: NURSE PRACTITIONER

## 2021-12-23 LAB
THYROGLOB AB SERPL-ACNC: <1 IU/ML (ref 0–0.9)
THYROPEROXIDASE AB SERPL-ACNC: 88 IU/ML (ref 0–34)

## 2021-12-27 DIAGNOSIS — R79.89 ABNORMAL THYROID BLOOD TEST: Primary | ICD-10-CM

## 2021-12-29 DIAGNOSIS — E03.9 HYPOTHYROIDISM, UNSPECIFIED TYPE: ICD-10-CM

## 2021-12-29 DIAGNOSIS — F32.89 OTHER DEPRESSION: Chronic | ICD-10-CM

## 2021-12-29 RX ORDER — LEVOTHYROXINE SODIUM 0.07 MG/1
TABLET ORAL
Qty: 30 TABLET | Refills: 0 | Status: SHIPPED | OUTPATIENT
Start: 2021-12-29 | End: 2022-02-01

## 2021-12-29 RX ORDER — FLUOXETINE 20 MG/1
TABLET, FILM COATED ORAL
Qty: 30 TABLET | Refills: 2 | Status: SHIPPED | OUTPATIENT
Start: 2021-12-29 | End: 2022-01-13 | Stop reason: SDUPTHER

## 2022-01-06 DIAGNOSIS — N93.9 ABNORMAL UTERINE AND VAGINAL BLEEDING, UNSPECIFIED: Primary | ICD-10-CM

## 2022-01-11 ENCOUNTER — HOSPITAL ENCOUNTER (OUTPATIENT)
Dept: ULTRASOUND IMAGING | Facility: HOSPITAL | Age: 42
Discharge: HOME | End: 2022-01-11
Admitting: NURSE PRACTITIONER

## 2022-01-11 PROCEDURE — 76536 US EXAM OF HEAD AND NECK: CPT

## 2022-01-12 ENCOUNTER — DOCUMENTATION (OUTPATIENT)
Dept: FAMILY MEDICINE CLINIC | Age: 42
End: 2022-01-12

## 2022-01-12 DIAGNOSIS — E03.9 HYPOTHYROIDISM, UNSPECIFIED TYPE: Primary | ICD-10-CM

## 2022-01-12 RX ORDER — LEVOTHYROXINE SODIUM 0.1 MG/1
100 TABLET ORAL DAILY
Qty: 90 TABLET | Refills: 0 | Status: SHIPPED | OUTPATIENT
Start: 2022-01-12 | End: 2022-04-12

## 2022-01-13 ENCOUNTER — HOSPITAL ENCOUNTER (OUTPATIENT)
Dept: MAMMOGRAPHY | Facility: HOSPITAL | Age: 42
Discharge: HOME | End: 2022-01-13
Admitting: NURSE PRACTITIONER

## 2022-01-13 DIAGNOSIS — F32.89 OTHER DEPRESSION: Chronic | ICD-10-CM

## 2022-01-13 DIAGNOSIS — Z12.31 ENCOUNTER FOR SCREENING MAMMOGRAM FOR MALIGNANT NEOPLASM OF BREAST: ICD-10-CM

## 2022-01-13 PROCEDURE — 77063 BREAST TOMOSYNTHESIS BI: CPT

## 2022-01-13 PROCEDURE — 77067 SCR MAMMO BI INCL CAD: CPT

## 2022-01-13 NOTE — TELEPHONE ENCOUNTER
Caller: [facility] - [place], [place] - [address] [address] - [phone] [facility] [phone] FX    Relationship: Pharmacy    Best call back number: [phone]    Requested Prescriptions:   Requested Prescriptions     Pending Prescriptions Disp Refills   • FLUoxetine (PROzac) 20 MG tablet 30 tablet 2     Sig: Take 1 tablet by mouth Daily.        Pharmacy where request should be sent: [facility] - [place], [place] - [address] [facility]. - [phone] [facility] [phone] FX     Additional details provided by patient: PATIENTS PHARMACY CALLED NEEDING THE PRESCRIPTION NEEDS TO BE CHANGED TO [facility].    Does the patient have less than a 3 day supply:  [] Yes  [x] No    Ashwin Pedro Rep   01/13/22 15:00 EST

## 2022-01-14 RX ORDER — FLUOXETINE 20 MG/1
20 TABLET, FILM COATED ORAL DAILY
Qty: 30 TABLET | Refills: 2 | Status: SHIPPED | OUTPATIENT
Start: 2022-01-14 | End: 2022-07-21 | Stop reason: SDUPTHER

## 2022-01-21 ENCOUNTER — APPOINTMENT (OUTPATIENT)
Dept: ULTRASOUND IMAGING | Facility: HOSPITAL | Age: 42
End: 2022-01-21

## 2022-01-30 DIAGNOSIS — E03.9 HYPOTHYROIDISM, UNSPECIFIED TYPE: ICD-10-CM

## 2022-02-01 RX ORDER — LEVOTHYROXINE SODIUM 0.07 MG/1
TABLET ORAL
Qty: 90 TABLET | Refills: 0 | Status: SHIPPED | OUTPATIENT
Start: 2022-02-01 | End: 2022-07-21

## 2022-03-08 DIAGNOSIS — E03.8 OTHER SPECIFIED HYPOTHYROIDISM: Primary | ICD-10-CM

## 2022-03-22 ENCOUNTER — PATIENT MESSAGE (OUTPATIENT)
Dept: FAMILY MEDICINE CLINIC | Age: 42
End: 2022-03-22

## 2022-03-23 ENCOUNTER — LAB (OUTPATIENT)
Dept: LAB | Facility: HOSPITAL | Age: 42
End: 2022-03-23

## 2022-03-23 DIAGNOSIS — E03.8 OTHER SPECIFIED HYPOTHYROIDISM: ICD-10-CM

## 2022-03-23 LAB — TSH SERPL DL<=0.05 MIU/L-ACNC: 2.11 UIU/ML (ref 0.27–4.2)

## 2022-03-23 PROCEDURE — 36415 COLL VENOUS BLD VENIPUNCTURE: CPT

## 2022-03-23 PROCEDURE — 84443 ASSAY THYROID STIM HORMONE: CPT

## 2022-04-06 ENCOUNTER — DOCUMENTATION (OUTPATIENT)
Dept: FAMILY MEDICINE CLINIC | Age: 42
End: 2022-04-06

## 2022-04-06 DIAGNOSIS — E03.8 HYPOTHYROIDISM DUE TO HASHIMOTO'S THYROIDITIS: Primary | ICD-10-CM

## 2022-04-06 DIAGNOSIS — E06.3 HYPOTHYROIDISM DUE TO HASHIMOTO'S THYROIDITIS: Primary | ICD-10-CM

## 2022-04-06 NOTE — PROGRESS NOTES
Pt had transvaginal ultrasound ord 1/6/22, scheduled for 1/21/22, pt cancelled wanting to get done at gyn office. tsh ord 1/12/22, cancelled per provider wanting repeated at a later date after reviewing thyroid ultrasound.

## 2022-04-12 RX ORDER — LEVOTHYROXINE SODIUM 0.1 MG/1
TABLET ORAL
Qty: 90 TABLET | Refills: 0 | Status: SHIPPED | OUTPATIENT
Start: 2022-04-12 | End: 2022-07-21

## 2022-07-18 ENCOUNTER — LAB (OUTPATIENT)
Dept: LAB | Facility: HOSPITAL | Age: 42
End: 2022-07-18

## 2022-07-18 DIAGNOSIS — R53.83 FATIGUE, UNSPECIFIED TYPE: ICD-10-CM

## 2022-07-18 DIAGNOSIS — E06.3 HYPOTHYROIDISM DUE TO HASHIMOTO'S THYROIDITIS: ICD-10-CM

## 2022-07-18 DIAGNOSIS — E03.8 HYPOTHYROIDISM DUE TO HASHIMOTO'S THYROIDITIS: ICD-10-CM

## 2022-07-18 LAB
DEPRECATED RDW RBC AUTO: 42.5 FL (ref 37–54)
ERYTHROCYTE [DISTWIDTH] IN BLOOD BY AUTOMATED COUNT: 12.9 % (ref 12.3–15.4)
HCT VFR BLD AUTO: 39.9 % (ref 34–46.6)
HGB BLD-MCNC: 12.7 G/DL (ref 12–15.9)
MCH RBC QN AUTO: 28.8 PG (ref 26.6–33)
MCHC RBC AUTO-ENTMCNC: 31.8 G/DL (ref 31.5–35.7)
MCV RBC AUTO: 90.5 FL (ref 79–97)
PLATELET # BLD AUTO: 227 10*3/MM3 (ref 140–450)
PMV BLD AUTO: 8.9 FL (ref 6–12)
RBC # BLD AUTO: 4.41 10*6/MM3 (ref 3.77–5.28)
TSH SERPL DL<=0.05 MIU/L-ACNC: 17.7 UIU/ML (ref 0.27–4.2)
WBC NRBC COR # BLD: 7.67 10*3/MM3 (ref 3.4–10.8)

## 2022-07-18 PROCEDURE — 84443 ASSAY THYROID STIM HORMONE: CPT

## 2022-07-18 PROCEDURE — 85027 COMPLETE CBC AUTOMATED: CPT

## 2022-07-18 PROCEDURE — 36415 COLL VENOUS BLD VENIPUNCTURE: CPT

## 2022-07-21 ENCOUNTER — TELEMEDICINE (OUTPATIENT)
Dept: FAMILY MEDICINE CLINIC | Age: 42
End: 2022-07-21

## 2022-07-21 ENCOUNTER — DOCUMENTATION (OUTPATIENT)
Dept: FAMILY MEDICINE CLINIC | Age: 42
End: 2022-07-21

## 2022-07-21 DIAGNOSIS — F32.89 OTHER DEPRESSION: Chronic | ICD-10-CM

## 2022-07-21 DIAGNOSIS — E03.9 HYPOTHYROIDISM, UNSPECIFIED TYPE: Primary | Chronic | ICD-10-CM

## 2022-07-21 PROCEDURE — 99213 OFFICE O/P EST LOW 20 MIN: CPT | Performed by: NURSE PRACTITIONER

## 2022-07-21 RX ORDER — BUPROPION HYDROCHLORIDE 300 MG/1
300 TABLET ORAL DAILY
Qty: 90 TABLET | Refills: 1 | Status: SHIPPED | OUTPATIENT
Start: 2022-07-21 | End: 2022-09-28

## 2022-07-21 RX ORDER — FLUOXETINE 20 MG/1
20 TABLET, FILM COATED ORAL DAILY
Qty: 90 TABLET | Refills: 1 | Status: SHIPPED | OUTPATIENT
Start: 2022-07-21

## 2022-07-21 RX ORDER — LEVOTHYROXINE SODIUM 0.12 MG/1
125 TABLET ORAL DAILY
Qty: 90 TABLET | Refills: 0 | Status: SHIPPED | OUTPATIENT
Start: 2022-07-21 | End: 2022-10-17

## 2022-07-21 NOTE — PROGRESS NOTES
Mode of Visit: Video  You have chosen to receive care through a telehealth visit.  Do you consent to use a video/audio connection for your medical care today? Yes   The visit included audio and video interaction. No technical issues occurred during this visit.    Subjective       Chief Complaint   Patient presents with   • Anxiety     Follow up - med refills/discuss medication  - review labs - video visit - [phone]   • Depression   • Hypothyroidism         HPI:        Ambika Joe is a 41 y.o. female who presents to  [facility] today   Teleheath video visit for follow up on anxiety , depression , meds are helping some but feels very tired and not able to keep up with chores at home. Hx hypothyroidism, last TSH was wnl 2.2 , now up to over 17 but patient states she has continue taking the medication every morning by itself , no other foods or medications but her TSH has dramatically went up.  Her US thyroid 1/2022 was birads T3 , recommended to repeat US in 12 mo., will refer to Endocrine to see if they think she needs repeat US or needs a biopsy. Weight has went up by 20 pounds since January despite watching her diet and exercising.       Review of Systems   Constitutional: Positive for fatigue and unexpected weight gain. Negative for fever and unexpected weight loss.   HENT: Negative.         Snoring    Eyes: Negative.    Respiratory: Negative for cough, shortness of breath and wheezing.    Cardiovascular: Negative for chest pain, palpitations and leg swelling.   Gastrointestinal: Negative for abdominal pain.   Endocrine: Negative.    Genitourinary: Negative for breast lump, breast pain, dysuria, frequency and urgency.   Musculoskeletal: Negative for gait problem.   Skin: Negative.    Neurological: Negative for dizziness, tremors, seizures, weakness and memory problem.   Psychiatric/Behavioral: Positive for depressed mood. Negative for  behavioral problems and suicidal ideas. The patient is nervous/anxious.             PE:   Objective   There were no vitals taken for this visit.    There is no height or weight on file to calculate BMI.    Physical Exam   Constitutional: She appears well-developed and well-nourished.   Eyes: Pupils are equal, round, and reactive to light.   Pulmonary/Chest: Effort normal.   Musculoskeletal: Normal range of motion.   Neurological: She is alert.                               A/P:     Assessment & Plan   Diagnoses and all orders for this visit:    1. Hypothyroidism, unspecified type (Primary)  -     levothyroxine (Synthroid) 125 MCG tablet; Take 1 tablet by mouth Daily. Indications: Underactive Thyroid  Dispense: 90 tablet; Refill: 0  -     TSH; Future  -     Vitamin D 25 hydroxy; Future  -     Ambulatory Referral to Endocrinology    2. Other depression  -     buPROPion XL (WELLBUTRIN XL) 300 MG 24 hr tablet; Take 1 tablet by mouth Daily for 180 days.  Dispense: 90 tablet; Refill: 1  -     FLUoxetine (PROzac) 20 MG tablet; Take 1 tablet by mouth Daily.  Dispense: 90 tablet; Refill: 1              Follow up:    No follow-ups on file.   Chief Complaint  Ambika Joe presents to [facility] for Anxiety (Follow up - med refills/discuss medication  - review labs - video visit - [phone]), Depression, and Hypothyroidism    Subjective          History of Present Illness      Review of Systems   Constitutional: Positive for fatigue and unexpected weight gain. Negative for fever and unexpected weight loss.   HENT: Negative.         Snoring    Eyes: Negative.    Respiratory: Negative for cough, shortness of breath and wheezing.    Cardiovascular: Negative for chest pain, palpitations and leg swelling.   Gastrointestinal: Negative for abdominal pain.   Endocrine: Negative.    Genitourinary: Negative for breast lump, breast pain, dysuria, frequency and urgency.   Musculoskeletal: Negative  for gait problem.   Skin: Negative.    Neurological: Negative for dizziness, tremors, seizures, weakness and memory problem.   Psychiatric/Behavioral: Positive for depressed mood. Negative for behavioral problems and suicidal ideas. The patient is nervous/anxious.          No Known Allergies   Past Medical History:   Diagnosis Date   • Anxiety    • Depression    • Hypertension    • Hypothyroidism    • Obesity      Current Outpatient Medications   Medication Sig Dispense Refill   • buPROPion XL (WELLBUTRIN XL) 300 MG 24 hr tablet Take 1 tablet by mouth Daily for 180 days. 90 tablet 1   • FLUoxetine (PROzac) 20 MG tablet Take 1 tablet by mouth Daily. 90 tablet 1   • levothyroxine (Synthroid) 125 MCG tablet Take 1 tablet by mouth Daily. Indications: Underactive Thyroid 90 tablet 0     No current facility-administered medications for this visit.     Past Surgical History:   Procedure Laterality Date   •  SECTION     •  SECTION        Social History     Tobacco Use   • Smoking status: Never Smoker   • Smokeless tobacco: Never Used   Vaping Use   • Vaping Use: Never used   Substance Use Topics   • Alcohol use: Never   • Drug use: Never     Family History   Problem Relation Age of Onset   • No Known Problems Father      Health Maintenance Due   Topic Date Due   • ANNUAL PHYSICAL  Never done   • HEPATITIS C SCREENING  Never done   • PAP SMEAR  Never done   • COVID-19 Vaccine (3 - Booster for Moderna series) 2021      Immunization History   Administered Date(s) Administered   • COVID-19 (MODERNA) 1st, 2nd, 3rd Dose Only 2021, 2021   • Hepatitis A 2018   • Tdap 2012, 2014        Objective     There were no vitals filed for this visit.  There is no height or weight on file to calculate BMI.     Physical Exam  Constitutional:       Appearance: She is well-developed and well-nourished.   Eyes:      Pupils: Pupils are equal, round, and reactive to light.   Pulmonary:       Effort: Pulmonary effort is normal.   Musculoskeletal:         General: Normal range of motion.   Neurological:      Mental Status: She is alert.           Result Review :    Lab on 07/18/2022   Component Date Value Ref Range Status   • TSH 07/18/2022 17.700 (A) 0.270 - 4.200 uIU/mL Final   • WBC 07/18/2022 7.67  3.40 - 10.80 10*3/mm3 Final   • RBC 07/18/2022 4.41  3.77 - 5.28 10*6/mm3 Final   • Hemoglobin 07/18/2022 12.7  12.0 - 15.9 g/dL Final   • Hematocrit 07/18/2022 39.9  34.0 - 46.6 % Final   • MCV 07/18/2022 90.5  79.0 - 97.0 fL Final   • MCH 07/18/2022 28.8  26.6 - 33.0 pg Final   • MCHC 07/18/2022 31.8  31.5 - 35.7 g/dL Final   • RDW 07/18/2022 12.9  12.3 - 15.4 % Final   • RDW-SD 07/18/2022 42.5  37.0 - 54.0 fl Final   • MPV 07/18/2022 8.9  6.0 - 12.0 fL Final   • Platelets 07/18/2022 227  140 - 450 10*3/mm3 Final   Lab on 03/23/2022   Component Date Value Ref Range Status   • TSH 03/23/2022 2.110  0.270 - 4.200 uIU/mL Final                        Assessment and Plan      Diagnoses and all orders for this visit:    1. Hypothyroidism, unspecified type (Primary)  -     levothyroxine (Synthroid) 125 MCG tablet; Take 1 tablet by mouth Daily. Indications: Underactive Thyroid  Dispense: 90 tablet; Refill: 0  -     TSH; Future  -     Vitamin D 25 hydroxy; Future  -     Ambulatory Referral to Endocrinology    2. Other depression  -     buPROPion XL (WELLBUTRIN XL) 300 MG 24 hr tablet; Take 1 tablet by mouth Daily for 180 days.  Dispense: 90 tablet; Refill: 1  -     FLUoxetine (PROzac) 20 MG tablet; Take 1 tablet by mouth Daily.  Dispense: 90 tablet; Refill: 1            Follow Up     No follow-ups on file.

## 2022-07-22 ENCOUNTER — PRIOR AUTHORIZATION (OUTPATIENT)
Dept: FAMILY MEDICINE CLINIC | Age: 42
End: 2022-07-22

## 2022-09-07 ENCOUNTER — TELEPHONE (OUTPATIENT)
Dept: FAMILY MEDICINE CLINIC | Age: 42
End: 2022-09-07

## 2022-09-07 NOTE — TELEPHONE ENCOUNTER
----- Message from Maritza Walsh LPN sent at 9/6/2022  4:13 PM EDT -----      ----- Message -----  From: SYSTEM  Sent: 9/6/2022   1:45 AM EDT  To: [facility] [place] [facility] [place]

## 2022-09-21 ENCOUNTER — LAB (OUTPATIENT)
Dept: LAB | Facility: HOSPITAL | Age: 42
End: 2022-09-21

## 2022-09-21 DIAGNOSIS — E03.9 HYPOTHYROIDISM, UNSPECIFIED TYPE: Chronic | ICD-10-CM

## 2022-09-21 LAB
25(OH)D3 SERPL-MCNC: 47.2 NG/ML (ref 30–100)
TSH SERPL DL<=0.05 MIU/L-ACNC: 0.66 UIU/ML (ref 0.27–4.2)

## 2022-09-21 PROCEDURE — 84443 ASSAY THYROID STIM HORMONE: CPT

## 2022-09-21 PROCEDURE — 82306 VITAMIN D 25 HYDROXY: CPT

## 2022-09-21 PROCEDURE — 36415 COLL VENOUS BLD VENIPUNCTURE: CPT

## 2022-09-22 ENCOUNTER — PATIENT MESSAGE (OUTPATIENT)
Dept: FAMILY MEDICINE CLINIC | Age: 42
End: 2022-09-22

## 2022-09-28 ENCOUNTER — OFFICE VISIT (OUTPATIENT)
Dept: FAMILY MEDICINE CLINIC | Age: 42
End: 2022-09-28

## 2022-09-28 VITALS
SYSTOLIC BLOOD PRESSURE: 137 MMHG | BODY MASS INDEX: 39.21 KG/M2 | HEIGHT: 67 IN | DIASTOLIC BLOOD PRESSURE: 84 MMHG | HEART RATE: 78 BPM | WEIGHT: 249.8 LBS

## 2022-09-28 DIAGNOSIS — E66.09 CLASS 2 OBESITY DUE TO EXCESS CALORIES WITHOUT SERIOUS COMORBIDITY WITH BODY MASS INDEX (BMI) OF 39.0 TO 39.9 IN ADULT: Primary | ICD-10-CM

## 2022-09-28 DIAGNOSIS — E04.1 THYROID NODULE: ICD-10-CM

## 2022-09-28 PROCEDURE — 99214 OFFICE O/P EST MOD 30 MIN: CPT | Performed by: NURSE PRACTITIONER

## 2022-09-28 NOTE — PROGRESS NOTES
"Chief Complaint  Establish Care, Follow-up, Weight Loss, and Thyroid Problem    Subjective          Ambika Joe presents to [facility]  History of Present Illness  Patient is here to establish care.  She is a former patient of Alex Coates.    Anxiety and depression: She is currently taking Wellbutrin 300 mg daily and Prozac 20 mg daily. She thinks that she is doing okay.     Hypothyroidism: She is currently taking levothyroxine 125 mcg daily.  Her last TSH was normal when it was checked last week. She did have a thyroid ultrasound which did show a 7 mm TR 3 nodule in the mid right lobe January 2022.  She was referred to endocrinology, but the referrals department had a hard time making contact with the patient to get her scheduled.    She reports that she is the heaviest since giving birth. Her thyroid has been having issues, but is currently regulated.       Objective   Vital Signs:   /84 (BP Location: Left arm, Patient Position: Sitting)   Pulse 78   Ht 170.2 cm (67\")   Wt 113 kg (249 lb 12.8 oz)   BMI 39.12 kg/m²     Physical Exam  Constitutional:       General: She is not in acute distress.     Appearance: Normal appearance. She is obese.   HENT:      Head: Normocephalic.   Eyes:      Pupils: Pupils are equal, round, and reactive to light.      Visual Fields: Right eye visual fields normal and left eye visual fields normal.   Neck:      Trachea: Trachea normal.   Cardiovascular:      Rate and Rhythm: Normal rate and regular rhythm.      Heart sounds: Normal heart sounds.   Pulmonary:      Effort: Pulmonary effort is normal.      Breath sounds: Normal breath sounds and air entry.   Musculoskeletal:      Right lower leg: No edema.      Left lower leg: No edema.   Skin:     General: Skin is warm and dry.   Neurological:      Mental Status: She is alert and oriented to person, place, and time.   Psychiatric:         Mood and Affect: Mood and affect normal.    "      Behavior: Behavior normal.         Thought Content: Thought content normal.        Result Review :   The following data was reviewed by: LISA Stauffer on 09/28/2022:  Comprehensive Metabolic Panel (12/22/2021 14:07)  CBC (No Diff) (07/18/2022 09:08)  TSH (09/21/2022 08:29)  Vitamin D 25 hydroxy (09/21/2022 08:29)  US Thyroid (01/11/2022 10:05)                   Assessment and Plan    Diagnoses and all orders for this visit:    1. Class 2 obesity due to excess calories without serious comorbidity with body mass index (BMI) of 39.0 to 39.9 in adult (Primary)  -     naltrexone-bupropion ER (CONTRAVE) 8-90 MG tablet; Wk 1: 1 tab daily, Wk 2: 1 tab twice a day, Wk 3: 2 tabs in AM, 1 tab in PM, Wk 4: 2 tabs twice a day, Maintenance dose: 2 tabs twice daily.  Dispense: 120 tablet; Refill: 0    2. Thyroid nodule  -     US Thyroid; Future      Have discussed different weight loss options.  I do not prescribe phentermine.  Due to her thyroid nodule, will hold off on sending in Saxenda or Wegovy.  We have discussed Contrave and that it contains bupropion in it.  At this time, she is agreeable to discontinue her Wellbutrin and start Contrave.  I have also ordered a thyroid ultrasound to be completed in January to reevaluate her nodule.  Her thyroid is currently regulated.  She feels that her thyroid has been symptomatic between now and 6 months, she will let me know and I will order a TSH.    Follow Up   Return in about 6 months (around 3/28/2023).  Patient was given instructions and counseling regarding her condition or for health maintenance advice. Please see specific information pulled into the AVS if appropriate.

## 2022-09-30 ENCOUNTER — PRIOR AUTHORIZATION (OUTPATIENT)
Dept: FAMILY MEDICINE CLINIC | Age: 42
End: 2022-09-30

## 2022-10-03 NOTE — TELEPHONE ENCOUNTER
Please let her know since we were going to discontinue her Wellbutrin, so she can continue to take her Wellbutrin. If she's interested in the referral to ENT to check her thyroid nodule, I can place that. Until that gets checked out, cannot send in any other weight loss medication.

## 2022-10-12 DIAGNOSIS — E04.1 THYROID NODULE: Primary | ICD-10-CM

## 2022-10-17 DIAGNOSIS — E03.9 HYPOTHYROIDISM, UNSPECIFIED TYPE: Chronic | ICD-10-CM

## 2022-10-17 RX ORDER — LEVOTHYROXINE SODIUM 125 MCG
TABLET ORAL
Qty: 90 TABLET | Refills: 5 | Status: SHIPPED | OUTPATIENT
Start: 2022-10-17

## 2023-01-12 ENCOUNTER — APPOINTMENT (OUTPATIENT)
Dept: ULTRASOUND IMAGING | Facility: HOSPITAL | Age: 43
End: 2023-01-12
Payer: COMMERCIAL

## 2023-02-14 ENCOUNTER — HOSPITAL ENCOUNTER (OUTPATIENT)
Dept: ULTRASOUND IMAGING | Facility: HOSPITAL | Age: 43
Discharge: HOME | End: 2023-02-14
Admitting: NURSE PRACTITIONER
Payer: COMMERCIAL

## 2023-02-14 DIAGNOSIS — E04.1 THYROID NODULE: ICD-10-CM

## 2023-02-14 PROCEDURE — 76536 US EXAM OF HEAD AND NECK: CPT

## 2023-04-28 DIAGNOSIS — F32.89 OTHER DEPRESSION: Chronic | ICD-10-CM

## 2023-04-28 RX ORDER — BUPROPION HYDROCHLORIDE 300 MG/1
TABLET ORAL
Qty: 90 TABLET | Refills: 5 | OUTPATIENT
Start: 2023-04-28